# Patient Record
Sex: FEMALE | Race: WHITE | NOT HISPANIC OR LATINO | Employment: OTHER | URBAN - METROPOLITAN AREA
[De-identification: names, ages, dates, MRNs, and addresses within clinical notes are randomized per-mention and may not be internally consistent; named-entity substitution may affect disease eponyms.]

---

## 2022-04-07 ENCOUNTER — HISTORICAL (OUTPATIENT)
Dept: ADMINISTRATIVE | Facility: HOSPITAL | Age: 77
End: 2022-04-07
Payer: MEDICARE

## 2022-04-23 VITALS
HEIGHT: 64 IN | WEIGHT: 145.75 LBS | SYSTOLIC BLOOD PRESSURE: 125 MMHG | OXYGEN SATURATION: 97 % | BODY MASS INDEX: 24.88 KG/M2 | DIASTOLIC BLOOD PRESSURE: 70 MMHG

## 2022-06-05 ENCOUNTER — OFFICE VISIT (OUTPATIENT)
Dept: URGENT CARE | Facility: CLINIC | Age: 77
End: 2022-06-05
Payer: MEDICARE

## 2022-06-05 VITALS
HEART RATE: 78 BPM | WEIGHT: 145 LBS | DIASTOLIC BLOOD PRESSURE: 66 MMHG | RESPIRATION RATE: 17 BRPM | OXYGEN SATURATION: 97 % | HEIGHT: 63 IN | SYSTOLIC BLOOD PRESSURE: 149 MMHG | BODY MASS INDEX: 25.69 KG/M2 | TEMPERATURE: 98 F

## 2022-06-05 DIAGNOSIS — H60.90 OTITIS EXTERNA, UNSPECIFIED CHRONICITY, UNSPECIFIED LATERALITY, UNSPECIFIED TYPE: ICD-10-CM

## 2022-06-05 DIAGNOSIS — R09.82 POSTNASAL DRIP: ICD-10-CM

## 2022-06-05 DIAGNOSIS — H92.02 OTALGIA, LEFT: Primary | ICD-10-CM

## 2022-06-05 PROCEDURE — 99213 PR OFFICE/OUTPT VISIT, EST, LEVL III, 20-29 MIN: ICD-10-PCS | Mod: 25,,, | Performed by: FAMILY MEDICINE

## 2022-06-05 PROCEDURE — 96372 THER/PROPH/DIAG INJ SC/IM: CPT | Mod: ,,, | Performed by: FAMILY MEDICINE

## 2022-06-05 PROCEDURE — 96372 PR INJECTION,THERAP/PROPH/DIAG2ST, IM OR SUBCUT: ICD-10-PCS | Mod: ,,, | Performed by: FAMILY MEDICINE

## 2022-06-05 PROCEDURE — 99213 OFFICE O/P EST LOW 20 MIN: CPT | Mod: 25,,, | Performed by: FAMILY MEDICINE

## 2022-06-05 RX ORDER — DOXYCYCLINE HYCLATE 50 MG/1
50 CAPSULE ORAL DAILY
COMMUNITY
Start: 2022-02-10

## 2022-06-05 RX ORDER — NAPROXEN SODIUM 220 MG/1
1 TABLET, FILM COATED ORAL DAILY
COMMUNITY
Start: 2022-02-26 | End: 2023-09-06

## 2022-06-05 RX ORDER — DEXAMETHASONE SODIUM PHOSPHATE 100 MG/10ML
5 INJECTION INTRAMUSCULAR; INTRAVENOUS
Status: COMPLETED | OUTPATIENT
Start: 2022-06-05 | End: 2022-06-05

## 2022-06-05 RX ORDER — MAGNESIUM 30 MG
30 TABLET ORAL
COMMUNITY

## 2022-06-05 RX ORDER — NEOMYCIN SULFATE, POLYMYXIN B SULFATE, HYDROCORTISONE 3.5; 10000; 1 MG/ML; [USP'U]/ML; MG/ML
3 SOLUTION/ DROPS AURICULAR (OTIC) EVERY 6 HOURS
Qty: 10 ML | Refills: 0 | Status: SHIPPED | OUTPATIENT
Start: 2022-06-05 | End: 2022-06-12

## 2022-06-05 RX ORDER — TRAMADOL HYDROCHLORIDE 50 MG/1
50 TABLET ORAL EVERY 8 HOURS PRN
COMMUNITY
Start: 2022-05-11

## 2022-06-05 RX ORDER — LEVOTHYROXINE SODIUM 88 UG/1
1 TABLET ORAL EVERY MORNING
COMMUNITY
Start: 2022-06-01 | End: 2023-09-06

## 2022-06-05 RX ORDER — CYANOCOBALAMIN 1000 UG/ML
1000 INJECTION, SOLUTION INTRAMUSCULAR; SUBCUTANEOUS
COMMUNITY
Start: 2022-03-14

## 2022-06-05 RX ORDER — AMLODIPINE BESYLATE 2.5 MG/1
1 TABLET ORAL DAILY
COMMUNITY
Start: 2021-08-30

## 2022-06-05 RX ORDER — EZETIMIBE 10 MG/1
10 TABLET ORAL DAILY
COMMUNITY
Start: 2022-03-24

## 2022-06-05 RX ORDER — HYDROXYZINE HYDROCHLORIDE 25 MG/1
25 TABLET, FILM COATED ORAL 3 TIMES DAILY PRN
COMMUNITY
Start: 2021-11-03

## 2022-06-05 RX ORDER — ZOLPIDEM TARTRATE 10 MG/1
10 TABLET ORAL NIGHTLY PRN
COMMUNITY
Start: 2022-05-26

## 2022-06-05 RX ORDER — PREDNISONE 20 MG/1
20 TABLET ORAL DAILY
Qty: 5 TABLET | Refills: 0 | Status: SHIPPED | OUTPATIENT
Start: 2022-06-05 | End: 2022-06-10

## 2022-06-05 RX ORDER — LOSARTAN POTASSIUM 50 MG/1
75 TABLET ORAL DAILY
COMMUNITY
Start: 2022-02-18

## 2022-06-05 RX ADMIN — DEXAMETHASONE SODIUM PHOSPHATE 5 MG: 100 INJECTION INTRAMUSCULAR; INTRAVENOUS at 02:06

## 2022-06-05 NOTE — PATIENT INSTRUCTIONS
Medications sent to pharmacy.  Start the oral steroids tomorrow morning.  Start taking Claritin or Zyrtec daily and Flonase twice a day.  Tylenol or ibuprofen as needed for pain.  As discussed if there is no improvement over the next 24-48 hours notify this clinic and we will refer you to an ear nose throat doctor

## 2022-06-05 NOTE — PROGRESS NOTES
"Subjective:       Patient ID: Lucia Lemus is a 76 y.o. female.    Vitals:  height is 5' 3" (1.6 m) and weight is 65.8 kg (145 lb). Her temperature is 98.4 °F (36.9 °C). Her blood pressure is 149/66 (abnormal) and her pulse is 78. Her respiration is 17 and oxygen saturation is 97%.     Chief Complaint: Otalgia (Left ear pain ( 10) for 3 days )    76 y.o. female presents to clinic with left ear pain for 3 days.  Patient states she is also having sinus congestion and postnasal drip.  Denies any recent swimming but states when pressing over the ear she feels pain.  Denies any fever or drainage from the ear.    Otalgia   There is pain in the left ear. The current episode started in the past 7 days. The pain is at a severity of 10/10.       Constitution: Negative.   HENT: Positive for ear pain, congestion and postnasal drip.    Cardiovascular: Negative.    Eyes: Negative.    Respiratory: Negative.    Gastrointestinal: Negative.    Genitourinary: Negative.    Musculoskeletal: Negative.    Skin: Negative.    Allergic/Immunologic: Negative.    Neurological: Negative.    Hematologic/Lymphatic: Negative.        Objective:      Physical Exam   Constitutional: She is oriented to person, place, and time.   HENT:   Ears:   Left Ear: impacted cerumen (The wax present however there is erythema of the left ear canal.  Left TM dull in appearance. pain with tragal manipulation and pinna traction on the left)  Mouth/Throat: Oropharynx is clear.   Eyes: Conjunctivae are normal.   Abdominal: Normal appearance.   Neurological: She is alert and oriented to person, place, and time.   Psychiatric: Her behavior is normal. Mood, judgment and thought content normal.   Vitals reviewed.         Previous History      Review of patient's allergies indicates:   Allergen Reactions    Soybean     Statins-hmg-coa reductase inhibitors      Other reaction(s): leg cramps    Sulfa (sulfonamide antibiotics) Rash       Past Medical History:   Diagnosis " "Date    Hyperlipidemia     Hypertension     Hypothyroidism      Current Outpatient Medications   Medication Instructions    amLODIPine (NORVASC) 2.5 MG tablet 1 tablet, Oral, Daily    aspirin 81 MG Chew 1 tablet, Oral, Daily    cyanocobalamin 1,000 mcg, Intramuscular, Every 7 days    doxycycline (VIBRAMYCIN) 50 mg, Oral, Daily    ezetimibe (ZETIA) 10 mg, Oral, Daily    hydrOXYzine HCL (ATARAX) 25 mg, Oral, 3 times daily PRN    levothyroxine (SYNTHROID) 88 MCG tablet 1 tablet, Oral, Every morning    losartan (COZAAR) 75 mg, Oral, Daily    magnesium 30 mg, Oral    neomycin-polymyxin-hydrocortisone (CORTISPORIN) otic solution 3 drops, Left Ear, Every 6 hours    predniSONE (DELTASONE) 20 mg, Oral, Daily    traMADoL (ULTRAM) 50 mg, Oral, Every 8 hours PRN    zolpidem (AMBIEN) 10 mg, Oral, Nightly PRN     Past Surgical History:   Procedure Laterality Date    heart stent       HYSTERECTOMY      THYROIDECTOMY       Family History   Problem Relation Age of Onset    No Known Problems Mother     No Known Problems Father        Social History     Tobacco Use    Smoking status: Never Smoker    Smokeless tobacco: Never Used   Substance Use Topics    Alcohol use: Never    Drug use: Never        Physical Exam      Vital Signs Reviewed   BP (!) 149/66   Pulse 78   Temp 98.4 °F (36.9 °C)   Resp 17   Ht 5' 3" (1.6 m)   Wt 65.8 kg (145 lb)   SpO2 97%   BMI 25.69 kg/m²        Procedures    Procedures     Labs   No results found for this or any previous visit.      Assessment:       1. Otalgia, left    2. Postnasal drip    3. Otitis externa, unspecified chronicity, unspecified laterality, unspecified type          Plan:         Medications sent to pharmacy.  Start the oral steroids tomorrow morning.  Start taking Claritin or Zyrtec daily and Flonase twice a day.  Tylenol or ibuprofen as needed for pain.  As discussed if there is no improvement over the next 24-48 hours notify this clinic and we will refer " you to an ear nose throat doctor      Otalgia, left    Postnasal drip    Otitis externa, unspecified chronicity, unspecified laterality, unspecified type    Other orders  -     dexamethasone injection 5 mg  -     predniSONE (DELTASONE) 20 MG tablet; Take 1 tablet (20 mg total) by mouth once daily. for 5 days  Dispense: 5 tablet; Refill: 0  -     neomycin-polymyxin-hydrocortisone (CORTISPORIN) otic solution; Place 3 drops into the left ear every 6 (six) hours. for 7 days  Dispense: 10 mL; Refill: 0

## 2022-08-12 ENCOUNTER — OFFICE VISIT (OUTPATIENT)
Dept: URGENT CARE | Facility: CLINIC | Age: 77
End: 2022-08-12
Payer: MEDICARE

## 2022-08-12 VITALS
DIASTOLIC BLOOD PRESSURE: 68 MMHG | HEART RATE: 68 BPM | SYSTOLIC BLOOD PRESSURE: 177 MMHG | WEIGHT: 145 LBS | BODY MASS INDEX: 25.69 KG/M2 | TEMPERATURE: 98 F | OXYGEN SATURATION: 99 % | HEIGHT: 63 IN | RESPIRATION RATE: 18 BRPM

## 2022-08-12 DIAGNOSIS — T78.40XA ALLERGIC REACTION, INITIAL ENCOUNTER: Primary | ICD-10-CM

## 2022-08-12 PROCEDURE — 96372 PR INJECTION,THERAP/PROPH/DIAG2ST, IM OR SUBCUT: ICD-10-PCS | Mod: ,,, | Performed by: FAMILY MEDICINE

## 2022-08-12 PROCEDURE — 96372 THER/PROPH/DIAG INJ SC/IM: CPT | Mod: ,,, | Performed by: FAMILY MEDICINE

## 2022-08-12 PROCEDURE — 99213 PR OFFICE/OUTPT VISIT, EST, LEVL III, 20-29 MIN: ICD-10-PCS | Mod: 25,,, | Performed by: FAMILY MEDICINE

## 2022-08-12 PROCEDURE — 99213 OFFICE O/P EST LOW 20 MIN: CPT | Mod: 25,,, | Performed by: FAMILY MEDICINE

## 2022-08-12 RX ORDER — DEXAMETHASONE SODIUM PHOSPHATE 100 MG/10ML
8 INJECTION INTRAMUSCULAR; INTRAVENOUS
Status: COMPLETED | OUTPATIENT
Start: 2022-08-12 | End: 2022-08-12

## 2022-08-12 RX ORDER — PREDNISONE 10 MG/1
30 TABLET ORAL DAILY
Qty: 9 TABLET | Refills: 0 | Status: SHIPPED | OUTPATIENT
Start: 2022-08-12 | End: 2022-08-15

## 2022-08-12 RX ADMIN — DEXAMETHASONE SODIUM PHOSPHATE 8 MG: 100 INJECTION INTRAMUSCULAR; INTRAVENOUS at 05:08

## 2022-08-12 NOTE — PATIENT INSTRUCTIONS
Steroids sent to pharmacy.  You can take hydroxyzine as needed for itch.  May cause drowsiness.  Start oral steroids tomorrow.  Stop using the facial cream.  Seek medical attention immediately if your symptoms worsen or you develop lip swelling tongue swelling difficulty swallowing shortness of breath or wheezing

## 2022-08-12 NOTE — PROGRESS NOTES
"Subjective:       Patient ID: Lucia Lemus is a 76 y.o. female.    Vitals:  height is 5' 2.99" (1.6 m) and weight is 65.8 kg (145 lb). Her oral temperature is 98.4 °F (36.9 °C). Her blood pressure is 177/68 (abnormal) and her pulse is 68. Her respiration is 18 and oxygen saturation is 99%.     Chief Complaint: Facial Swelling    76 y.o. female presents to clinic c/o facial swelling, burning, itching x last night. Pt states she used a new facial cream last night.  Denies any lip swelling tongue swelling shortness of breath or wheezing.      Constitution: Negative.   HENT: Negative.    Cardiovascular: Negative.    Eyes: Negative.    Respiratory: Negative.    Gastrointestinal: Negative.    Genitourinary: Negative.    Musculoskeletal: Negative.    Skin: Negative.    Allergic/Immunologic: Negative.    Neurological: Negative.    Hematologic/Lymphatic: Negative.        Objective:      Physical Exam   HENT:   Mouth/Throat: No posterior oropharyngeal erythema (no lip Swelling or tongue swelling). Oropharynx is clear.   Pulmonary/Chest: Effort normal.   Abdominal: Normal appearance.   Neurological: She is alert.   Vitals reviewed.         Previous History      Review of patient's allergies indicates:   Allergen Reactions    Soybean     Statins-hmg-coa reductase inhibitors      Other reaction(s): leg cramps    Sulfa (sulfonamide antibiotics) Rash       Past Medical History:   Diagnosis Date    Hyperlipidemia     Hypertension     Hypothyroidism      Current Outpatient Medications   Medication Instructions    amLODIPine (NORVASC) 2.5 MG tablet 1 tablet, Oral, Daily    aspirin 81 MG Chew 1 tablet, Oral, Daily    cyanocobalamin 1,000 mcg, Intramuscular, Every 7 days    doxycycline 50 mg, Oral, Daily    ezetimibe (ZETIA) 10 mg, Oral, Daily    hydrOXYzine HCL (ATARAX) 25 mg, Oral, 3 times daily PRN    levothyroxine (SYNTHROID) 88 MCG tablet 1 tablet, Oral, Every morning    losartan (COZAAR) 75 mg, Oral, Daily    " "magnesium 30 mg, Oral    predniSONE (DELTASONE) 30 mg, Oral, Daily    traMADoL (ULTRAM) 50 mg, Oral, Every 8 hours PRN    zolpidem (AMBIEN) 10 mg, Oral, Nightly PRN     Past Surgical History:   Procedure Laterality Date    heart stent       HYSTERECTOMY      THYROIDECTOMY       Family History   Problem Relation Age of Onset    No Known Problems Mother     No Known Problems Father        Social History     Tobacco Use    Smoking status: Never Smoker    Smokeless tobacco: Never Used   Substance Use Topics    Alcohol use: Never    Drug use: Never        Physical Exam      Vital Signs Reviewed   BP (!) 177/68 (BP Location: Left arm, Patient Position: Sitting)   Pulse 68   Temp 98.4 °F (36.9 °C) (Oral)   Resp 18   Ht 5' 2.99" (1.6 m)   Wt 65.8 kg (145 lb)   SpO2 99%   BMI 25.69 kg/m²        Procedures    Procedures     Labs   No results found for this or any previous visit.      Assessment:       1. Allergic reaction, initial encounter          Plan:       Steroids sent to pharmacy.  You can take hydroxyzine as needed for itch.  May cause drowsiness.  Start oral steroids tomorrow.  Stop using the facial cream.  Seek medical attention immediately if your symptoms worsen or you develop lip swelling tongue swelling difficulty swallowing shortness of breath or wheezing  Allergic reaction, initial encounter    Other orders  -     dexamethasone injection 8 mg  -     predniSONE (DELTASONE) 10 MG tablet; Take 3 tablets (30 mg total) by mouth once daily. for 3 days  Dispense: 9 tablet; Refill: 0                     "

## 2023-02-23 ENCOUNTER — OFFICE VISIT (OUTPATIENT)
Dept: URGENT CARE | Facility: CLINIC | Age: 78
End: 2023-02-23
Payer: MEDICARE

## 2023-02-23 VITALS
SYSTOLIC BLOOD PRESSURE: 145 MMHG | TEMPERATURE: 99 F | HEART RATE: 74 BPM | DIASTOLIC BLOOD PRESSURE: 72 MMHG | RESPIRATION RATE: 16 BRPM | OXYGEN SATURATION: 98 %

## 2023-02-23 DIAGNOSIS — J34.89 SINUS PRESSURE: ICD-10-CM

## 2023-02-23 DIAGNOSIS — J32.9 SINUSITIS, UNSPECIFIED CHRONICITY, UNSPECIFIED LOCATION: Primary | ICD-10-CM

## 2023-02-23 PROCEDURE — 99213 OFFICE O/P EST LOW 20 MIN: CPT | Mod: 25,,,

## 2023-02-23 PROCEDURE — 96372 THER/PROPH/DIAG INJ SC/IM: CPT | Mod: ,,,

## 2023-02-23 PROCEDURE — 99213 PR OFFICE/OUTPT VISIT, EST, LEVL III, 20-29 MIN: ICD-10-PCS | Mod: 25,,,

## 2023-02-23 PROCEDURE — 96372 PR INJECTION,THERAP/PROPH/DIAG2ST, IM OR SUBCUT: ICD-10-PCS | Mod: ,,,

## 2023-02-23 RX ORDER — AZITHROMYCIN 250 MG/1
TABLET, FILM COATED ORAL
Qty: 6 TABLET | Refills: 0 | Status: SHIPPED | OUTPATIENT
Start: 2023-02-23 | End: 2023-02-28

## 2023-02-23 RX ORDER — CALCITRIOL 0.25 UG/1
0.25 CAPSULE ORAL
COMMUNITY
Start: 2022-10-14

## 2023-02-23 RX ORDER — BETAMETHASONE SODIUM PHOSPHATE AND BETAMETHASONE ACETATE 3; 3 MG/ML; MG/ML
6 INJECTION, SUSPENSION INTRA-ARTICULAR; INTRALESIONAL; INTRAMUSCULAR; SOFT TISSUE
Status: COMPLETED | OUTPATIENT
Start: 2023-02-23 | End: 2023-02-23

## 2023-02-23 RX ADMIN — BETAMETHASONE SODIUM PHOSPHATE AND BETAMETHASONE ACETATE 6 MG: 3; 3 INJECTION, SUSPENSION INTRA-ARTICULAR; INTRALESIONAL; INTRAMUSCULAR; SOFT TISSUE at 12:02

## 2023-02-23 NOTE — PROGRESS NOTES
Subjective:       Patient ID: Lucia Lemus is a 77 y.o. female.    Vitals:  temperature is 98.6 °F (37 °C). Her blood pressure is 147/72 (abnormal) and her pulse is 74. Her respiration is 16 and oxygen saturation is 98%.     Chief Complaint: Headache (Headache, left ear pain x8 days. No OTC medications taken today.)    Headache, left ear pain x8 days. No OTC medications taken today.    Headache     Neurological:  Positive for headaches.     Objective:      Physical Exam      Assessment:       No diagnosis found.      Plan:         There are no diagnoses linked to this encounter.

## 2023-02-23 NOTE — PROGRESS NOTES
Subjective:       Patient ID: Lucia Lemus is a 77 y.o. female.    Vitals:  temperature is 98.6 °F (37 °C). Her blood pressure is 145/72 (abnormal) and her pulse is 74. Her respiration is 16 and oxygen saturation is 98%.     Chief Complaint: Headache (Headache, left ear pain x8 days. No OTC medications taken today.)    Patient is a 77-year-old female that presents complaining of an 8 day history of sinus congestion, sinus pressure headache, left ear pain not getting better with over-the-counter medications.  She states that she called her doctor and they recommended several over-the-counter medications that she is tried with no improvement and she is tired of it.  Patient denies any SOB, CP, rash, n/v/d, or neck stiffness.      Headache   Associated symptoms include ear pain and sinus pressure.     HENT:  Positive for ear pain, congestion and sinus pressure.    Neurological:  Positive for headaches.     Objective:      Physical Exam   Constitutional: She is oriented to person, place, and time.  Non-toxic appearance. She does not appear ill.   HENT:   Ears:   Right Ear: Tympanic membrane, external ear and ear canal normal.   Left Ear: Tympanic membrane, external ear and ear canal normal.   Nose: Rhinorrhea present. Right sinus exhibits maxillary sinus tenderness. Left sinus exhibits maxillary sinus tenderness.   Mouth/Throat: No tonsillar exudate.   Clear PND noted.       Comments: Clear PND noted.   Pulmonary/Chest: Effort normal and breath sounds normal.   Abdominal: Normal appearance and bowel sounds are normal. Soft. There is no abdominal tenderness.   Musculoskeletal: Normal range of motion.         General: Normal range of motion.   Neurological: She is alert and oriented to person, place, and time.   Skin: Skin is warm and dry. Capillary refill takes less than 2 seconds.   Psychiatric: Her behavior is normal. Mood normal.       Assessment:       1. Sinusitis, unspecified chronicity, unspecified location    2.  Sinus pressure        Plan:         Sinusitis, unspecified chronicity, unspecified location  -     betamethasone acetate-betamethasone sodium phosphate injection 6 mg  -     azithromycin (Z-YUMIKO) 250 MG tablet; Take 2 tablets by mouth on day 1; Take 1 tablet by mouth on days 2-5  Dispense: 6 tablet; Refill: 0    Sinus pressure    Wanted to get patient on Augmentin but she requested azithromycin stating it usually works better for her.      Take OTC cough/cold/congestion medication such as Dayquil/Nyquil.  May also take antihistamine such as Claritin/Zyrtec/Allegra.  Cepacol sore throat lozenges if needed.     Drink plenty of fluids.  Rest.      Take antibiotic until completely gone. Take with food to avoid upset stomach.

## 2023-05-21 ENCOUNTER — OFFICE VISIT (OUTPATIENT)
Dept: URGENT CARE | Facility: CLINIC | Age: 78
End: 2023-05-21
Payer: MEDICARE

## 2023-05-21 VITALS
TEMPERATURE: 99 F | DIASTOLIC BLOOD PRESSURE: 77 MMHG | HEART RATE: 64 BPM | BODY MASS INDEX: 25.69 KG/M2 | RESPIRATION RATE: 18 BRPM | HEIGHT: 63 IN | OXYGEN SATURATION: 99 % | SYSTOLIC BLOOD PRESSURE: 152 MMHG | WEIGHT: 145 LBS

## 2023-05-21 DIAGNOSIS — L71.9 ROSACEA: Primary | ICD-10-CM

## 2023-05-21 PROCEDURE — 99213 PR OFFICE/OUTPT VISIT, EST, LEVL III, 20-29 MIN: ICD-10-PCS | Mod: S$PBB,25,, | Performed by: FAMILY MEDICINE

## 2023-05-21 PROCEDURE — 96372 THER/PROPH/DIAG INJ SC/IM: CPT | Mod: S$PBB,,, | Performed by: FAMILY MEDICINE

## 2023-05-21 PROCEDURE — 99213 OFFICE O/P EST LOW 20 MIN: CPT | Mod: S$PBB,25,, | Performed by: FAMILY MEDICINE

## 2023-05-21 PROCEDURE — 96372 PR INJECTION,THERAP/PROPH/DIAG2ST, IM OR SUBCUT: ICD-10-PCS | Mod: S$PBB,,, | Performed by: FAMILY MEDICINE

## 2023-05-21 RX ORDER — CLINDAMYCIN PHOSPHATE 10 UG/ML
LOTION TOPICAL 2 TIMES DAILY
Qty: 60 ML | Refills: 0 | Status: SHIPPED | OUTPATIENT
Start: 2023-05-21 | End: 2023-09-06

## 2023-05-21 RX ORDER — DEXAMETHASONE SODIUM PHOSPHATE 100 MG/10ML
6 INJECTION INTRAMUSCULAR; INTRAVENOUS
Status: COMPLETED | OUTPATIENT
Start: 2023-05-21 | End: 2023-05-21

## 2023-05-21 RX ADMIN — DEXAMETHASONE SODIUM PHOSPHATE 6 MG: 100 INJECTION INTRAMUSCULAR; INTRAVENOUS at 02:05

## 2023-05-21 NOTE — PROGRESS NOTES
"Subjective:      Patient ID: Lucia Lemus is a 77 y.o. female.    Vitals:  height is 5' 2.99" (1.6 m) and weight is 65.8 kg (145 lb). Her temperature is 98.8 °F (37.1 °C). Her blood pressure is 152/77 (abnormal) and her pulse is 64. Her respiration is 18 and oxygen saturation is 99%.     Chief Complaint: Rash    77 y.o. female presents to clinic w/ c/o painful (burning) rash on L side of face x1wk. Reports her dermatologist dx rash as rosacea and prescribed Soolantra w/ no improvement. Other alleviating factors used include aloe vera and cold compresses w/ no improvement.  Patient states she would like a steroid shot since that has worked in the past.    Constitution: Negative.   HENT: Negative.     Cardiovascular: Negative.    Eyes: Negative.    Respiratory: Negative.     Gastrointestinal: Negative.    Genitourinary: Negative.    Musculoskeletal: Negative.    Skin:  Positive for rash.   Allergic/Immunologic: Negative.    Neurological: Negative.    Hematologic/Lymphatic: Negative.     Objective:     Physical Exam   Constitutional: She is oriented to person, place, and time.  Non-toxic appearance. She does not appear ill. No distress.   HENT:   Head: Normocephalic and atraumatic.   Eyes: Conjunctivae are normal.   Abdominal: Normal appearance.   Neurological: She is alert and oriented to person, place, and time.   Skin: Skin is not diaphoretic and rash (flat erythemic rash with warmth on the left side of the face.  No papules or pustules).   Psychiatric: Her behavior is normal. Mood, judgment and thought content normal.        Previous History      Review of patient's allergies indicates:   Allergen Reactions    Soybean     Statins-hmg-coa reductase inhibitors      Other reaction(s): leg cramps    Sulfa (sulfonamide antibiotics) Rash       Past Medical History:   Diagnosis Date    Hyperlipidemia     Hypertension     Hypothyroidism      Current Outpatient Medications   Medication Instructions    amLODIPine (NORVASC) " "2.5 MG tablet 1 tablet, Oral, Daily    aspirin 81 MG Chew 1 tablet, Oral, Daily    calcitRIOL (ROCALTROL) 0.25 mcg, Oral    clindamycin (CLEOCIN T) 1 % lotion Topical (Top), 2 times daily    cyanocobalamin 1,000 mcg, Intramuscular, Every 7 days    doxycycline 50 mg, Oral, Daily    ezetimibe (ZETIA) 10 mg, Oral, Daily    hydrOXYzine HCL (ATARAX) 25 mg, Oral, 3 times daily PRN    levothyroxine (SYNTHROID) 88 MCG tablet 1 tablet, Oral, Every morning    losartan (COZAAR) 75 mg, Oral, Daily    magnesium 30 mg, Oral    traMADoL (ULTRAM) 50 mg, Oral, Every 8 hours PRN    zolpidem (AMBIEN) 10 mg, Oral, Nightly PRN     Past Surgical History:   Procedure Laterality Date    heart stent       HYSTERECTOMY      THYROIDECTOMY       Family History   Problem Relation Age of Onset    No Known Problems Mother     No Known Problems Father     No Known Problems Sister        Social History     Tobacco Use    Smoking status: Never     Passive exposure: Never    Smokeless tobacco: Never   Substance Use Topics    Alcohol use: Never    Drug use: Never        Physical Exam      Vital Signs Reviewed   BP (!) 152/77   Pulse 64   Temp 98.8 °F (37.1 °C)   Resp 18   Ht 5' 2.99" (1.6 m)   Wt 65.8 kg (145 lb)   SpO2 99%   BMI 25.69 kg/m²        Procedures    Procedures     Labs   No results found for this or any previous visit.    Assessment:     1. Rosacea        Plan:   Medications sent to pharmacy  Recommend following up with your dermatologist to discuss further  Continue to avoid your triggers such as excessive sun exposure.  Contact this clinic with any concerns    Rosacea    Other orders  -     dexAMETHasone injection 6 mg  -     clindamycin (CLEOCIN T) 1 % lotion; Apply topically 2 (two) times daily. for 10 days  Dispense: 60 mL; Refill: 0                    "

## 2023-05-21 NOTE — PATIENT INSTRUCTIONS
Medications sent to pharmacy  Recommend following up with your dermatologist to discuss further  Continue to avoid your triggers such as excessive sun exposure.  Contact this clinic with any concerns

## 2023-05-23 ENCOUNTER — PATIENT MESSAGE (OUTPATIENT)
Dept: RESEARCH | Facility: HOSPITAL | Age: 78
End: 2023-05-23
Payer: MEDICARE

## 2023-07-15 ENCOUNTER — OFFICE VISIT (OUTPATIENT)
Dept: URGENT CARE | Facility: CLINIC | Age: 78
End: 2023-07-15
Payer: MEDICARE

## 2023-07-15 VITALS
DIASTOLIC BLOOD PRESSURE: 52 MMHG | OXYGEN SATURATION: 100 % | BODY MASS INDEX: 26.68 KG/M2 | SYSTOLIC BLOOD PRESSURE: 171 MMHG | HEIGHT: 62 IN | TEMPERATURE: 98 F | WEIGHT: 145 LBS | HEART RATE: 69 BPM | RESPIRATION RATE: 17 BRPM

## 2023-07-15 DIAGNOSIS — B96.89 BACTERIAL SINUSITIS: Primary | ICD-10-CM

## 2023-07-15 DIAGNOSIS — J32.9 BACTERIAL SINUSITIS: Primary | ICD-10-CM

## 2023-07-15 PROCEDURE — 99213 PR OFFICE/OUTPT VISIT, EST, LEVL III, 20-29 MIN: ICD-10-PCS | Mod: ,,, | Performed by: FAMILY MEDICINE

## 2023-07-15 PROCEDURE — 99213 OFFICE O/P EST LOW 20 MIN: CPT | Mod: ,,, | Performed by: FAMILY MEDICINE

## 2023-07-15 RX ORDER — AZITHROMYCIN 250 MG/1
TABLET, FILM COATED ORAL
Qty: 6 TABLET | Refills: 0 | Status: SHIPPED | OUTPATIENT
Start: 2023-07-15 | End: 2023-07-20

## 2023-07-15 NOTE — PATIENT INSTRUCTIONS
Antibiotics sent to pharmacy  Start taking an allergy pill daily such as claritin, zyrtec, allegrea or xyzal. Also start using a nasal steroid spray such as flonase or nasacort daily.Monitor for fever. Take tylenol/acetaminophen or ibuprofen as needed. Rest and hydrate.    As discussed, continue to monitor your blood pressure closely at home.  If it remains elevated call your PCP 1st thing Monday morning to discuss further    Steroids were not given due to blood pressure     If symptoms persist or worsen, return to clinic or seek medical attention immediately.

## 2023-07-15 NOTE — PROGRESS NOTES
"Subjective:      Patient ID: Lucia Lemus is a 77 y.o. female.    Vitals:  height is 5' 2" (1.575 m) and weight is 65.8 kg (145 lb). Her temperature is 98.3 °F (36.8 °C). Her blood pressure is 171/52 (abnormal) and her pulse is 69. Her respiration is 17 and oxygen saturation is 100%.     Chief Complaint: Sinus Problem    77 y.o. female presents to clinic w/ c/o h/a, dizziness, congestion, sinus pain and pressure, and L ear fullness w/ decreased hearing x5d. Declined covid, flu and strep testing.  Alleviating factors used include zyrtec, flonase w/ no improvement. Denies fever, neck stiffness, drainage from ear, ringing/popping, pain w/ chewing/applying pressure, cough, wheezing, SOB, N/V/D, abdominal pain and rash.  Patient had similar symptoms earlier in the year and was prescribed azithromycin.  States she did well with it and felt better afterwards.  I did discuss the patient's blood pressure today.  Patient states typically not this high.  She does take her blood pressure medications.  She did taken this morning.  Denies taking any type decongestants stimulant this morning.    Sinus Problem  Associated symptoms include congestion, ear pain and sinus pressure.   Constitution: Negative.   HENT:  Positive for ear pain, congestion, postnasal drip, sinus pain and sinus pressure.    Cardiovascular: Negative.    Eyes: Negative.    Respiratory: Negative.     Gastrointestinal: Negative.    Genitourinary: Negative.    Musculoskeletal: Negative.    Skin: Negative.    Allergic/Immunologic: Negative.    Neurological: Negative.    Hematologic/Lymphatic: Negative.     Objective:     Physical Exam   Constitutional: She is oriented to person, place, and time. She appears well-developed. She is cooperative.  Non-toxic appearance. She does not appear ill. No distress.   HENT:   Head: Normocephalic and atraumatic.   Ears:   Right Ear: Hearing and external ear normal. impacted cerumen (effusion right TM)  Left Ear: Hearing, tympanic " membrane and external ear normal.   Mouth/Throat: Oropharynx is clear and moist and mucous membranes are normal. No posterior oropharyngeal erythema (postnasal drip.  Maxillary sinus tenderness on palpation).   Eyes: Conjunctivae and lids are normal.   Neck: Trachea normal and phonation normal. Neck supple. No edema present. No erythema present. No neck rigidity present.   Cardiovascular: Normal rate.   Pulmonary/Chest: Effort normal and breath sounds normal. No stridor. No respiratory distress. She has no decreased breath sounds. She has no wheezes. She has no rhonchi. She has no rales.   Abdominal: Normal appearance.   Lymphadenopathy:     She has no cervical adenopathy.   Neurological: She is alert and oriented to person, place, and time. She exhibits normal muscle tone. Coordination normal.   Skin: Skin is warm, dry, intact, not diaphoretic and no rash.   Psychiatric: Her speech is normal and behavior is normal. Mood, judgment and thought content normal.   Nursing note and vitals reviewed.       Previous History      Review of patient's allergies indicates:   Allergen Reactions    Soybean     Statins-hmg-coa reductase inhibitors      Other reaction(s): leg cramps    Sulfa (sulfonamide antibiotics) Rash       Past Medical History:   Diagnosis Date    Hyperlipidemia     Hypertension     Hypothyroidism      Current Outpatient Medications   Medication Instructions    amLODIPine (NORVASC) 2.5 MG tablet 1 tablet, Oral, Daily    aspirin 81 MG Chew 1 tablet, Oral, Daily    azithromycin (Z-YUMIKO) 250 MG tablet Take 2 tablets by mouth on day 1; Take 1 tablet by mouth on days 2-5<BR>    calcitRIOL (ROCALTROL) 0.25 mcg, Oral    clindamycin (CLEOCIN T) 1 % lotion Topical (Top), 2 times daily    cyanocobalamin 1,000 mcg, Intramuscular, Every 7 days    doxycycline 50 mg, Oral, Daily    ezetimibe (ZETIA) 10 mg, Oral, Daily    hydrOXYzine HCL (ATARAX) 25 mg, Oral, 3 times daily PRN    levothyroxine (SYNTHROID) 88 MCG tablet 1  "tablet, Oral, Every morning    losartan (COZAAR) 75 mg, Oral, Daily    magnesium 30 mg, Oral    traMADoL (ULTRAM) 50 mg, Oral, Every 8 hours PRN    zolpidem (AMBIEN) 10 mg, Oral, Nightly PRN     Past Surgical History:   Procedure Laterality Date    heart stent       HYSTERECTOMY      THYROIDECTOMY       Family History   Problem Relation Age of Onset    No Known Problems Mother     No Known Problems Father     No Known Problems Sister        Social History     Tobacco Use    Smoking status: Never     Passive exposure: Never    Smokeless tobacco: Never   Substance Use Topics    Alcohol use: Never    Drug use: Never        Physical Exam      Vital Signs Reviewed   BP (!) 171/52   Pulse 69   Temp 98.3 °F (36.8 °C)   Resp 17   Ht 5' 2" (1.575 m)   Wt 65.8 kg (145 lb)   SpO2 100%   BMI 26.52 kg/m²        Procedures    Procedures     Labs   No results found for this or any previous visit.    Assessment:     1. Bacterial sinusitis        Plan:   Antibiotics sent to pharmacy  Start taking an allergy pill daily such as claritin, zyrtec, allegrea or xyzal. Also start using a nasal steroid spray such as flonase or nasacort daily.Monitor for fever. Take tylenol/acetaminophen or ibuprofen as needed. Rest and hydrate.    As discussed, continue to monitor your blood pressure closely at home.  If it remains elevated call your PCP 1st thing Monday morning to discuss further     If symptoms persist or worsen, return to clinic or seek medical attention immediately.       Bacterial sinusitis    Other orders  -     azithromycin (Z-YUMIKO) 250 MG tablet; Take 2 tablets by mouth on day 1; Take 1 tablet by mouth on days 2-5  Dispense: 6 tablet; Refill: 0                    "

## 2023-07-16 ENCOUNTER — TELEPHONE (OUTPATIENT)
Dept: URGENT CARE | Facility: CLINIC | Age: 78
End: 2023-07-16
Payer: MEDICARE

## 2023-07-16 RX ORDER — PREDNISONE 10 MG/1
30 TABLET ORAL DAILY
Qty: 15 TABLET | Refills: 0 | Status: SHIPPED | OUTPATIENT
Start: 2023-07-16 | End: 2023-07-21

## 2023-07-16 NOTE — ADDENDUM NOTE
Addended by: ANDREINA JESSICA on: 7/16/2023 02:17 PM     Modules accepted: Orders    
Hydroureteronephrosis

## 2023-07-16 NOTE — TELEPHONE ENCOUNTER
Pt notified and expressed understanding.   ----- Message from Norberto Willis MD sent at 7/16/2023  2:17 PM CDT -----  Regarding: FW: PT requesting steroids  Steroids sent to pharmacy  ----- Message -----  From: RT Harpreet  Sent: 7/16/2023  11:18 AM CDT  To: [facility]  Subject: PT requesting steroids                           Pt requesting steroids after her BP was 135/62 today. Pt states that providers discussed if her BP was lower today she could have steroids sent to pharmacy at time of visit yesterday. Please send to [facility] pharmacy in [place]. Spoke with Doctor Willis and stated he would send her out steriods to her pharmacy.

## 2023-08-30 ENCOUNTER — OFFICE VISIT (OUTPATIENT)
Dept: URGENT CARE | Facility: CLINIC | Age: 78
End: 2023-08-30
Payer: MEDICARE

## 2023-08-30 VITALS
SYSTOLIC BLOOD PRESSURE: 149 MMHG | HEART RATE: 74 BPM | BODY MASS INDEX: 26.68 KG/M2 | HEIGHT: 62 IN | OXYGEN SATURATION: 98 % | TEMPERATURE: 98 F | DIASTOLIC BLOOD PRESSURE: 75 MMHG | WEIGHT: 145 LBS | RESPIRATION RATE: 17 BRPM

## 2023-08-30 DIAGNOSIS — J01.11 ACUTE RECURRENT FRONTAL SINUSITIS: Primary | ICD-10-CM

## 2023-08-30 PROCEDURE — 99213 OFFICE O/P EST LOW 20 MIN: CPT | Mod: ,,,

## 2023-08-30 PROCEDURE — 99213 PR OFFICE/OUTPT VISIT, EST, LEVL III, 20-29 MIN: ICD-10-PCS | Mod: ,,,

## 2023-08-30 RX ORDER — PREDNISONE 20 MG/1
20 TABLET ORAL DAILY
Qty: 5 TABLET | Refills: 0 | Status: SHIPPED | OUTPATIENT
Start: 2023-08-30 | End: 2023-09-04

## 2023-08-30 RX ORDER — AMOXICILLIN AND CLAVULANATE POTASSIUM 875; 125 MG/1; MG/1
1 TABLET, FILM COATED ORAL EVERY 12 HOURS
Qty: 14 TABLET | Refills: 0 | Status: SHIPPED | OUTPATIENT
Start: 2023-08-30 | End: 2023-09-06

## 2023-08-30 NOTE — PATIENT INSTRUCTIONS
Medications called in to pharmacy   Start the antibiotic today, take all of the medication, even if symptoms improve. Take with food and consider a probiotic or yogurt.   ENT referral sent today, someone will call with an appointment in the next week.   Start taking an allergy pill daily such as claritin, zyrtec, allegrea or xyzal. Also start using a nasal steroid spray such as flonase or nasacort daily. If you are not being treated for high blood pressure, you can also take decongestant such as sudafed as needed. They can be purchased over the counter.Start the oral steroids today. Monitor for fever. Take tylenol/acetaminophen or ibuprofen as needed. Rest and hydrate. If symptoms persist or worsen, return to clinic or seek medical attention immediately.     Continue to monitor your blood pressure closely at home.  If it remains elevated call your PCP 1st thing Monday morning to discuss further

## 2023-08-30 NOTE — PROGRESS NOTES
"Subjective:      Patient ID: Lucia Lemus is a 77 y.o. female.    Vitals:  height is 5' 2" (1.575 m) and weight is 65.8 kg (145 lb). Her temperature is 97.8 °F (36.6 °C). Her blood pressure is 149/75 (abnormal) and her pulse is 74. Her respiration is 17 and oxygen saturation is 98%.     Chief Complaint: Nasal Congestion ( Patient is a 77 y.o. female who presents to urgent care with complaints of congestion, headache, sore throat, sinus pressure  x 10 days . Alleviating factors include OTC meds with no relief. Patient denies fever or n/v/d. )    A 76 y/o female presents to the clinic for c/o sinus pressure, nasal congestion, sinus pain/headache, post nasal drip and sore throat. She denies any fever, body aches, n/v/d, cough, sob, cp, n/v/d, abdominal complaints, rash, difficulty swallowing, neck stiffness, or changes in intake or output.           Constitution: Negative for chills and fever.   HENT:  Positive for congestion, postnasal drip, sinus pain, sinus pressure and sore throat. Negative for trouble swallowing and voice change.    Neck: neck negative.   Eyes: Negative.    Respiratory:  Negative for cough, shortness of breath, wheezing and asthma.    Gastrointestinal: Negative.    Genitourinary: Negative.    Allergic/Immunologic: Negative for asthma.      Objective:     Physical Exam   Constitutional: She is oriented to person, place, and time. She appears well-developed. She is cooperative.  Non-toxic appearance. She does not appear ill. No distress.   HENT:   Head: Normocephalic and atraumatic.   Ears:   Right Ear: Hearing and external ear normal. A middle ear effusion is present.   Left Ear: Hearing and external ear normal. A middle ear effusion is present.   Nose: Congestion (clear pnd) present. Right sinus exhibits maxillary sinus tenderness and frontal sinus tenderness. Left sinus exhibits maxillary sinus tenderness and frontal sinus tenderness.   Mouth/Throat: Uvula is midline and mucous membranes are " Abe Garner is a 82 year old male here for  Chief Complaint   Patient presents with   • Follow-up     Erythrocytosis     Denies latex allergy or sensitivity.    Medication verified and med list updated.  PCP and Pharmacy verified.    Social History     Tobacco Use   Smoking Status Former Smoker   • Packs/day: 0.50   • Years: 15.00   • Pack years: 7.50   • Types: Cigarettes   • Start date: 1955   • Last attempt to quit: 3/11/1968   • Years since quittin.4   Smokeless Tobacco Never Used     Advance Directives Filed: Yes    ECO - Fully active, able to carry on all predisease activities without restrictions.    Height: No.  Ht Readings from Last 1 Encounters:   19 6' (1.829 m)     Weight:Yes, shoes off.  Wt Readings from Last 3 Encounters:   19 90.2 kg   19 89.4 kg   19 88 kg       BMI: Body mass index is 26.98 kg/m².    REVIEW OF SYSTEMS  GENERAL:  Patient denies headache, fevers, chills, night sweats, excessive fatigue, change in appetite, weight loss, dizziness  ALLERGIC/IMMUNOLOGIC: Verified allergies: Yes  EYES:  Patient denies significant visual difficulties, double vision, blurred vision  ENT/MOUTH: Patient denies problems with hearing, sore throat, sinus drainage, mouth sores  ENDOCRINE:  Patient denies diabetes, thyroid disease, hormone replacement, hot flashes  HEMATOLOGIC/LYMPHATIC: Patient denies easy bruising, bleeding, tender lymph nodes, swollen lymph nodes  BREASTS: Patient denies abnormal masses of breast, nipple discharge, pain  RESPIRATORY:  Patient denies lung pain with breathing, cough, coughing up blood, shortness of breath  CARDIOVASCULAR:  Patient denies anginal chest pain, palpitations, shortness of breath when lying flat, peripheral edema  GASTROINTESTINAL: Patient denies abdominal pain , nausea, vomiting, diarrhea, GI bleeding, constipation, change in bowel habits, heartburn, sensation of feeling full, difficulty swallowing  : Patient denies blood in  normal. Mucous membranes are moist. Posterior oropharyngeal erythema present. Oropharynx is clear.   Eyes: Conjunctivae and lids are normal.   Neck: Trachea normal and phonation normal. Neck supple. No edema present. No erythema present. No neck rigidity present.   Cardiovascular: Normal rate.   Pulmonary/Chest: Effort normal and breath sounds normal. No stridor. No respiratory distress. She has no decreased breath sounds. She has no wheezes. She has no rhonchi. She has no rales.   Abdominal: Normal appearance.   Neurological: She is alert and oriented to person, place, and time. She exhibits normal muscle tone. Coordination normal.   Skin: Skin is warm, dry, intact, not diaphoretic and no rash.   Psychiatric: Her speech is normal and behavior is normal. Mood normal.   Nursing note and vitals reviewed.      Assessment:     1. Acute recurrent frontal sinusitis        Plan:       Acute recurrent frontal sinusitis  -     Ambulatory referral/consult to ENT    Other orders  -     amoxicillin-clavulanate 875-125mg (AUGMENTIN) 875-125 mg per tablet; Take 1 tablet by mouth every 12 (twelve) hours. for 7 days  Dispense: 14 tablet; Refill: 0  -     predniSONE (DELTASONE) 20 MG tablet; Take 1 tablet (20 mg total) by mouth once daily. for 5 days  Dispense: 5 tablet; Refill: 0    Medications called in to pharmacy   Start the antibiotic today, take all of the medication, even if symptoms improve. Take with food and consider a probiotic or yogurt.   ENT referral sent today, someone will call with an appointment in the next week.   Start taking an allergy pill daily such as claritin, zyrtec, allegrea or xyzal. Also start using a nasal steroid spray such as flonase or nasacort daily. If you are not being treated for high blood pressure, you can also take decongestant such as sudafed as needed. They can be purchased over the counter.Start the oral steroids today. Monitor for fever. Take tylenol/acetaminophen or ibuprofen as needed.  the urine, burning with urination, but complains of: frequency, urgency, hesitancy and incontinence  MUSCULOSKELETAL:  Patient denies joint pain, bone pain, joint swelling, redness, decreased range of motion  SKIN:  Patient denies chronic rashes, inflammation, ulcerations, skin changes, itching  NEUROLOGIC:  Patient denies loss of balance, areas of focal weakness, abnormal gait, sensory problems, numbness, tingling  PSYCHIATRIC: Patient denies insomnia, depression, anxiety   Rest and hydrate. If symptoms persist or worsen, return to clinic or seek medical attention immediately.     Continue to monitor your blood pressure closely at home.  If it remains elevated call your PCP 1st thing Monday morning to discuss further

## 2023-08-30 NOTE — PROGRESS NOTES
"Subjective:      Patient ID: Lucia Lemus is a 77 y.o. female.    Vitals:  height is 5' 2" (1.575 m) and weight is 65.8 kg (145 lb). Her temperature is 97.8 °F (36.6 °C). Her blood pressure is 149/75 (abnormal) and her pulse is 74. Her respiration is 17 and oxygen saturation is 98%.     Chief Complaint: Nasal Congestion ( Patient is a 77 y.o. female who presents to urgent care with complaints of congestion, headache, sore throat, sinus pressure  x 10 days . Alleviating factors include OTC meds with no relief. Patient denies fever or n/v/d. )     Patient is a 77 y.o. female who presents to urgent care with complaints of congestion, headache, sore throat, sinus pressure  x 10 days . Alleviating factors include OTC meds with no relief. Patient denies fever or n/v/d.   ROS   Objective:     Physical Exam    Assessment:     No diagnosis found.    Plan:       There are no diagnoses linked to this encounter.                "

## 2023-09-06 ENCOUNTER — OFFICE VISIT (OUTPATIENT)
Dept: URGENT CARE | Facility: CLINIC | Age: 78
End: 2023-09-06
Payer: MEDICARE

## 2023-09-06 VITALS
SYSTOLIC BLOOD PRESSURE: 169 MMHG | HEART RATE: 63 BPM | WEIGHT: 145 LBS | HEIGHT: 62 IN | BODY MASS INDEX: 26.68 KG/M2 | RESPIRATION RATE: 14 BRPM | DIASTOLIC BLOOD PRESSURE: 68 MMHG | TEMPERATURE: 99 F | OXYGEN SATURATION: 100 %

## 2023-09-06 DIAGNOSIS — J01.41 ACUTE RECURRENT PANSINUSITIS: Primary | ICD-10-CM

## 2023-09-06 PROCEDURE — 99213 OFFICE O/P EST LOW 20 MIN: CPT | Mod: ,,, | Performed by: FAMILY MEDICINE

## 2023-09-06 PROCEDURE — 99213 PR OFFICE/OUTPT VISIT, EST, LEVL III, 20-29 MIN: ICD-10-PCS | Mod: ,,, | Performed by: FAMILY MEDICINE

## 2023-09-06 RX ORDER — CLONIDINE HYDROCHLORIDE 0.1 MG/1
0.1 TABLET ORAL DAILY PRN
COMMUNITY

## 2023-09-06 RX ORDER — AZITHROMYCIN 250 MG/1
TABLET, FILM COATED ORAL
Qty: 6 TABLET | Refills: 0 | Status: SHIPPED | OUTPATIENT
Start: 2023-09-06

## 2023-09-06 RX ORDER — ALPRAZOLAM 0.5 MG/1
0.5 TABLET ORAL DAILY PRN
COMMUNITY
Start: 2023-08-16

## 2023-09-06 NOTE — PROGRESS NOTES
"Subjective:      Patient ID: Lucia Lemus is a 77 y.o. female.    Vitals:  height is 5' 2" (1.575 m) and weight is 65.8 kg (145 lb). Her temperature is 98.7 °F (37.1 °C). Her blood pressure is 169/68 (abnormal) and her pulse is 63. Her respiration is 14 and oxygen saturation is 100%.     Chief Complaint: Sinus Problem (Sinus pressure, facial pressure and headache ongoing since visit at [facility] on 08/30/23. Dx with Acute recurrent maxillary sinusitis and prescribed Augmentin & Prednisone(both completed). Pt states she has had no improvement since visit. States the antibiotic she received 07/15 (Azithromycin) helped more. )    HPI:  77-year-old female known for multiple medical conditions follows up with primary MD.  Present to clinic with concerns of ongoing nasal congestion, sinus pressure.  Was diagnosed with sinus infection a week ago completed taking Augmentin and prednisone as prescribed.  History of chronic sinus issues.  Today mainly presents with bilateral facial pressure sinus pressure and headache.  No fever.  Blood pressure elevated in the clinic.  Caution with over-the-counter cough and cold medications.  Crusting for Z-Gilmar as it helped in the past.  Understands the risk of increase use of antibiotics    ROS :  Constitutional : _ denies fever body aches or headache  HENT_ positive for nasal congestion, sinus congestion, sinus pressure  Respiratory_no wheezing, no shortness of breath  Cardiovascular_no chest pain  Gastrointestinal_ No vomiting, No diarrhea, No abdominal pain  Musculoskeletal_no joint pain, no joint swelling  Integumentary_no skin rash     Objective:     Physical Exam  General : Alert and Oriented, No apparent distress, afebrile  Neck - supple  HENT : Oropharynx no swelling, bilateral TMs intact mild fluid no redness.    Respiratory : Bilateral equal breath sounds, nonlabored respirations  Cardiovascular : Rate, rhythm regular, normal volume pulse, no murmur  Gastrointestinal: Full abdomen, " soft, nontender to palpate  Integumentary : Warm, Dry and no rash    Assessment:     1. Acute recurrent pansinusitis      Plan:   Cool mist vaporizer to keep there was moist and help draining sinuses.  Trial of nasal rinse.  Continue Zyrtec for congestion along with Flonase.  Monitor the symptoms.  Follow-up with ENT for persistent and worsening symptoms.  Patient requesting for Z-Yumiko as it helped in the past.    Acute recurrent pansinusitis  -     azithromycin (Z-YUMIKO) 250 MG tablet; Take 2 tablets by mouth on day 1; Take 1 tablet by mouth on days 2-5  Dispense: 6 tablet; Refill: 0

## 2023-09-06 NOTE — PATIENT INSTRUCTIONS
Cool mist vaporizer to keep there was moist and help draining sinuses.  Trial of nasal rinse.  Continue Zyrtec for congestion along with Flonase.  Monitor the symptoms.  Follow-up with ENT for persistent and worsening symptoms.  Patient requesting for Z-Gilmar as it helped in the past.

## 2024-01-20 ENCOUNTER — OFFICE VISIT (OUTPATIENT)
Dept: URGENT CARE | Facility: CLINIC | Age: 79
End: 2024-01-20
Payer: MEDICARE

## 2024-01-20 VITALS
TEMPERATURE: 99 F | WEIGHT: 135 LBS | HEIGHT: 62 IN | HEART RATE: 79 BPM | OXYGEN SATURATION: 99 % | BODY MASS INDEX: 24.84 KG/M2 | SYSTOLIC BLOOD PRESSURE: 132 MMHG | RESPIRATION RATE: 18 BRPM | DIASTOLIC BLOOD PRESSURE: 69 MMHG

## 2024-01-20 DIAGNOSIS — J01.90 ACUTE SINUSITIS, RECURRENCE NOT SPECIFIED, UNSPECIFIED LOCATION: Primary | ICD-10-CM

## 2024-01-20 PROCEDURE — 96372 THER/PROPH/DIAG INJ SC/IM: CPT | Mod: ,,,

## 2024-01-20 PROCEDURE — 99213 OFFICE O/P EST LOW 20 MIN: CPT | Mod: 25,,,

## 2024-01-20 RX ORDER — BETAMETHASONE SODIUM PHOSPHATE AND BETAMETHASONE ACETATE 3; 3 MG/ML; MG/ML
6 INJECTION, SUSPENSION INTRA-ARTICULAR; INTRALESIONAL; INTRAMUSCULAR; SOFT TISSUE
Status: COMPLETED | OUTPATIENT
Start: 2024-01-20 | End: 2024-01-20

## 2024-01-20 RX ORDER — DOXYCYCLINE 100 MG/1
100 CAPSULE ORAL EVERY 12 HOURS
Qty: 14 CAPSULE | Refills: 0 | Status: SHIPPED | OUTPATIENT
Start: 2024-01-20 | End: 2024-01-27

## 2024-01-20 RX ADMIN — BETAMETHASONE SODIUM PHOSPHATE AND BETAMETHASONE ACETATE 6 MG: 3; 3 INJECTION, SUSPENSION INTRA-ARTICULAR; INTRALESIONAL; INTRAMUSCULAR; SOFT TISSUE at 09:01

## 2024-01-20 NOTE — PROGRESS NOTES
"Subjective:      Patient ID: Lucia Lemus is a 78 y.o. female.    Vitals:  height is 5' 2" (1.575 m) and weight is 61.2 kg (135 lb). Her oral temperature is 98.6 °F (37 °C). Her blood pressure is 132/69 and her pulse is 79. Her respiration is 18 and oxygen saturation is 99%.     Chief Complaint: Dizziness (Right ear pain x 2 days, sinus pressure x 2 weeks )    Patient is a 78-year-old female who presents to urgent care clinic with complaints of sinus pressure that has been present for 2 weeks, patient is also complaining of bilateral otalgia, pressure and fluid in the ears.  She reports stated symptoms of vertigo, dizziness due to fluid in her ear yesterday.  Denies cough, fever, body aches, chills, neck stiffness, rash, GI symptoms, shortness of breath.  Patient has a history of recurrent sinusitis has not been treated with antibiotics since September.  He reports today sinus pressure is primarily maxillary.        Neurological:  Positive for dizziness.      Objective:     Physical Exam   Constitutional: She is oriented to person, place, and time. She appears well-developed. She is cooperative.  Non-toxic appearance. She does not appear ill. No distress.   HENT:   Head: Normocephalic and atraumatic.   Ears:   Right Ear: Hearing, external ear and ear canal normal. A middle ear effusion is present.   Left Ear: Hearing, external ear and ear canal normal. A middle ear effusion is present.      Comments: Bilateral TM:  Clear fluid, effusion  Nose: No mucosal edema, rhinorrhea or nasal deformity. No epistaxis. Right sinus exhibits maxillary sinus tenderness. Right sinus exhibits no frontal sinus tenderness. Left sinus exhibits maxillary sinus tenderness. Left sinus exhibits no frontal sinus tenderness.   Mouth/Throat: Uvula is midline, oropharynx is clear and moist and mucous membranes are normal. Mucous membranes are moist. No trismus in the jaw. Normal dentition. No uvula swelling. No oropharyngeal exudate, posterior " oropharyngeal edema or posterior oropharyngeal erythema.   Eyes: Conjunctivae and lids are normal. No scleral icterus.   Neck: Trachea normal and phonation normal. Neck supple. No edema present. No erythema present. No neck rigidity present.   Cardiovascular: Normal rate, regular rhythm, normal heart sounds and normal pulses.   Pulmonary/Chest: Effort normal and breath sounds normal. No respiratory distress. She has no decreased breath sounds. She has no rhonchi.   Abdominal: Normal appearance.   Musculoskeletal: Normal range of motion.         General: No deformity. Normal range of motion.   Lymphadenopathy:     She has no cervical adenopathy.   Neurological: She is alert and oriented to person, place, and time. She exhibits normal muscle tone. Coordination normal.   Skin: Skin is warm, dry, intact, not diaphoretic and not pale.   Psychiatric: Her speech is normal and behavior is normal. Judgment and thought content normal.   Nursing note and vitals reviewed.      Assessment:     1. Acute sinusitis, recurrence not specified, unspecified location        Plan:       Acute sinusitis, recurrence not specified, unspecified location  -     betamethasone acetate-betamethasone sodium phosphate injection 6 mg  -     doxycycline (MONODOX) 100 MG capsule; Take 1 capsule (100 mg total) by mouth every 12 (twelve) hours. for 7 days  Dispense: 14 capsule; Refill: 0           Patient is requesting steroid injection in clinic over oral steroids.  Blood Pressure repeated, given low-dose Celestone in clinic.  Patient has previously stated azithromycin works for sinuses however she has had azithromycin for sinusitis multiple times over the last year.  Will avoid at this time will treat with doxycycline.      Antibiotics with food.  Take antibiotics until complete.  Drink plenty of fluids. Get plenty of rest.   Claritin or Zyrtec for nasal congestion.  Nasal spray such as Nasacort or Flonase for congestion.  Warm saltwater gargles for  sore throat.   Tylenol as needed for sore throat and fever.   Chloraseptic Spray for worsening sore throat  Call or return to clinic as needed   Go to the ER with any significant change or worsening of symptoms.   Follow up with your primary care doctor.

## 2024-01-20 NOTE — PATIENT INSTRUCTIONS
Antibiotics with food.  Take antibiotics until complete.  Drink plenty of fluids. Get plenty of rest.   Claritin or Zyrtec for nasal congestion.  Nasal spray such as Nasacort or Flonase for congestion.  Warm saltwater gargles for sore throat.   Tylenol as needed for sore throat and fever.   Chloraseptic Spray for worsening sore throat  Call or return to clinic as needed   Go to the ER with any significant change or worsening of symptoms.   Follow up with your primary care doctor.

## 2024-04-21 ENCOUNTER — OFFICE VISIT (OUTPATIENT)
Dept: URGENT CARE | Facility: CLINIC | Age: 79
End: 2024-04-21
Payer: MEDICARE

## 2024-04-21 VITALS
HEIGHT: 62 IN | DIASTOLIC BLOOD PRESSURE: 65 MMHG | BODY MASS INDEX: 24.84 KG/M2 | SYSTOLIC BLOOD PRESSURE: 156 MMHG | HEART RATE: 62 BPM | OXYGEN SATURATION: 100 % | WEIGHT: 135 LBS | TEMPERATURE: 98 F | RESPIRATION RATE: 18 BRPM

## 2024-04-21 DIAGNOSIS — J32.9 SINUSITIS, UNSPECIFIED CHRONICITY, UNSPECIFIED LOCATION: Primary | ICD-10-CM

## 2024-04-21 PROCEDURE — 96372 THER/PROPH/DIAG INJ SC/IM: CPT | Mod: ,,,

## 2024-04-21 PROCEDURE — 99213 OFFICE O/P EST LOW 20 MIN: CPT | Mod: 25,,,

## 2024-04-21 RX ORDER — PRAVASTATIN SODIUM 20 MG/1
TABLET ORAL
COMMUNITY

## 2024-04-21 RX ORDER — PERMETHRIN 50 MG/G
CREAM TOPICAL
COMMUNITY
Start: 2023-12-11

## 2024-04-21 RX ORDER — BETAMETHASONE SODIUM PHOSPHATE AND BETAMETHASONE ACETATE 3; 3 MG/ML; MG/ML
6 INJECTION, SUSPENSION INTRA-ARTICULAR; INTRALESIONAL; INTRAMUSCULAR; SOFT TISSUE
Status: COMPLETED | OUTPATIENT
Start: 2024-04-21 | End: 2024-04-21

## 2024-04-21 RX ORDER — AZITHROMYCIN 250 MG/1
TABLET, FILM COATED ORAL
Qty: 6 TABLET | Refills: 0 | Status: SHIPPED | OUTPATIENT
Start: 2024-04-21 | End: 2024-04-26

## 2024-04-21 RX ORDER — ONDANSETRON 4 MG/1
4 TABLET, ORALLY DISINTEGRATING ORAL EVERY 8 HOURS PRN
COMMUNITY
Start: 2024-03-27

## 2024-04-21 RX ORDER — NITROGLYCERIN 0.4 MG/1
0.4 TABLET SUBLINGUAL
COMMUNITY
Start: 2024-02-28

## 2024-04-21 RX ADMIN — BETAMETHASONE SODIUM PHOSPHATE AND BETAMETHASONE ACETATE 6 MG: 3; 3 INJECTION, SUSPENSION INTRA-ARTICULAR; INTRALESIONAL; INTRAMUSCULAR; SOFT TISSUE at 02:04

## 2024-04-21 NOTE — PATIENT INSTRUCTIONS
Take antibiotic until completely gone. Take with food to avoid upset stomach.     Take OTC Decongestants  (Claritin D/sudafed OR Coricidin for people with HTN) to cut down on the fluid in the lining of your nose and relieve swollen nasal passages and congestion. May also use cough/cold/congestion medication such as Dayquil/Nyquil and/or antihistamine such as Claritin/Zyrtec/Allegra.     Drink plenty of fluids.  Rest.      Follow up with primary care in 5-6 days if not better.     Go directly to emergency room if you begin to have shortness of breath, chest pain, or other worrisome symptoms.

## 2024-04-21 NOTE — PROGRESS NOTES
"Subjective:      Patient ID: Lucia Lemus is a 78 y.o. female.    Vitals:  height is 5' 2" (1.575 m) and weight is 61.2 kg (135 lb). Her oral temperature is 98.4 °F (36.9 °C). Her blood pressure is 156/65 (abnormal) and her pulse is 62. Her respiration is 18 and oxygen saturation is 100%.     Chief Complaint: Headache    Patient is a 78 y.o. female who presents to urgent care with complaints of sinus headache on left side of head, left-sided sinus pressure, left ear pressure with a dry cough that gets worse at night x 10 days. Patient denies any SOB, CP, rash, n/v/d, or neck stiffness.  She states usually when this happens she needs a steroid shot and a Z-Gilmar.  States that last time she was here she was given doxycycline and that does not work for her.     Headache   Associated symptoms include coughing, ear pain and sinus pressure.     HENT:  Positive for ear pain, congestion and sinus pressure.    Respiratory:  Positive for cough.    Neurological:  Positive for headaches.      Objective:     Physical Exam   Constitutional: She is oriented to person, place, and time.  Non-toxic appearance. She does not appear ill.   HENT:   Ears:   Right Ear: Tympanic membrane, external ear and ear canal normal.   Left Ear: Tympanic membrane, external ear and ear canal normal.   Nose: Congestion present. Left sinus exhibits maxillary sinus tenderness and frontal sinus tenderness.   Mouth/Throat: Mucous membranes are moist. No posterior oropharyngeal erythema. No tonsillar exudate. Oropharynx is clear.      Comments: Clear postnasal drip  Eyes: Conjunctivae are normal.   Neck: Neck supple. No neck rigidity present.   Cardiovascular: Normal rate and normal pulses.   Pulmonary/Chest: Effort normal and breath sounds normal.   Abdominal: Normal appearance and bowel sounds are normal. Soft. There is no abdominal tenderness.   Neurological: She is alert and oriented to person, place, and time.   Skin: Skin is warm and no rash. Capillary " refill takes less than 2 seconds.   Psychiatric: Her behavior is normal. Mood normal.   Nursing note and vitals reviewed.      Assessment:     1. Sinusitis, unspecified chronicity, unspecified location        Plan:       Sinusitis, unspecified chronicity, unspecified location  -     betamethasone acetate-betamethasone sodium phosphate injection 6 mg  -     azithromycin (Z-YUMIKO) 250 MG tablet; Take 2 tablets by mouth on day 1; Take 1 tablet by mouth on days 2-5  Dispense: 6 tablet; Refill: 0      Take antibiotic until completely gone. Take with food to avoid upset stomach.     Take OTC Decongestants  (Claritin D/sudafed OR Coricidin for people with HTN) to cut down on the fluid in the lining of your nose and relieve swollen nasal passages and congestion. May also use cough/cold/congestion medication such as Dayquil/Nyquil and/or antihistamine such as Claritin/Zyrtec/Allegra.     Drink plenty of fluids.  Rest.      Follow up with primary care in 5-6 days if not better.     Go directly to emergency room if you begin to have shortness of breath, chest pain, or other worrisome symptoms.

## 2024-08-25 ENCOUNTER — OFFICE VISIT (OUTPATIENT)
Dept: URGENT CARE | Facility: CLINIC | Age: 79
End: 2024-08-25
Payer: MEDICARE

## 2024-08-25 VITALS
OXYGEN SATURATION: 98 % | SYSTOLIC BLOOD PRESSURE: 181 MMHG | HEIGHT: 62 IN | DIASTOLIC BLOOD PRESSURE: 78 MMHG | WEIGHT: 135 LBS | BODY MASS INDEX: 24.84 KG/M2 | RESPIRATION RATE: 17 BRPM | TEMPERATURE: 98 F | HEART RATE: 71 BPM

## 2024-08-25 DIAGNOSIS — R05.1 ACUTE COUGH: Primary | ICD-10-CM

## 2024-08-25 DIAGNOSIS — R09.81 NASAL CONGESTION: ICD-10-CM

## 2024-08-25 DIAGNOSIS — J01.01 ACUTE RECURRENT MAXILLARY SINUSITIS: ICD-10-CM

## 2024-08-25 PROCEDURE — 96372 THER/PROPH/DIAG INJ SC/IM: CPT | Mod: ,,, | Performed by: PHYSICIAN ASSISTANT

## 2024-08-25 PROCEDURE — 99213 OFFICE O/P EST LOW 20 MIN: CPT | Mod: 25,,, | Performed by: PHYSICIAN ASSISTANT

## 2024-08-25 RX ORDER — AMOXICILLIN 875 MG/1
875 TABLET, FILM COATED ORAL 2 TIMES DAILY
Qty: 20 TABLET | Refills: 0 | Status: SHIPPED | OUTPATIENT
Start: 2024-08-25 | End: 2024-09-04

## 2024-08-25 RX ORDER — DEXAMETHASONE SODIUM PHOSPHATE 10 MG/ML
8 INJECTION INTRAMUSCULAR; INTRAVENOUS
Status: COMPLETED | OUTPATIENT
Start: 2024-08-25 | End: 2024-08-25

## 2024-08-25 RX ORDER — PROMETHAZINE HYDROCHLORIDE AND DEXTROMETHORPHAN HYDROBROMIDE 6.25; 15 MG/5ML; MG/5ML
5 SYRUP ORAL EVERY 8 HOURS PRN
Qty: 118 ML | Refills: 0 | Status: SHIPPED | OUTPATIENT
Start: 2024-08-25 | End: 2024-08-30

## 2024-08-25 RX ADMIN — DEXAMETHASONE SODIUM PHOSPHATE 8 MG: 10 INJECTION INTRAMUSCULAR; INTRAVENOUS at 01:08

## 2024-08-25 NOTE — PATIENT INSTRUCTIONS
Recommend amoxicillin antibiotic coverage if concern for sinus infection.  Recommend daily non sedating antihistamine Claritin Zyrtec or loratadine over the next week with Coricidin decongestant if needed for nasal congestion sinus pressure and inflammation.  Phenergan DM sparingly lowest dose if needed for severe cough cold congestion body aches and rest.  Encouraged plenty of water and noncarbonated fluids hydration and rest over the next week.  Tylenol if needed for fever, aches and pains.  Recommend follow-up with primary care physician in 1 week for re-evaluation if not improving.

## 2024-08-25 NOTE — PROGRESS NOTES
"Subjective:      Patient ID: Lucia Lemus is a 78 y.o. female.    Vitals:  height is 5' 2" (1.575 m) and weight is 61.2 kg (135 lb). Her temperature is 98.1 °F (36.7 °C). Her blood pressure is 181/78 (abnormal) and her pulse is 71. Her respiration is 17 and oxygen saturation is 98%.     Chief Complaint: Sinus Problem    Elderly female reports 8 days of sinus pressure with headache congestion body aches not improved with over-the-counter Flonase nasal states now sinus congestion maxillary sinus pain worse in the evening presents to urgent Care for evaluation.  Patient refuses viral testing    Sinus Problem  This is a recurrent problem. Associated symptoms include congestion, coughing, headaches and sinus pressure. Pertinent negatives include no ear pain, neck pain, shortness of breath or sore throat.     Constitution: Positive for fatigue. Negative for fever.   HENT:  Positive for congestion, postnasal drip, sinus pain and sinus pressure. Negative for ear pain, sore throat, trouble swallowing and voice change.    Neck: Negative for neck pain and neck swelling.   Cardiovascular: Negative.    Respiratory:  Positive for cough. Negative for shortness of breath, stridor and wheezing.    Gastrointestinal: Negative.    Musculoskeletal:  Positive for muscle ache.   Skin: Negative.    Allergic/Immunologic: Negative.    Neurological:  Positive for headaches.      Objective:     Physical Exam   Constitutional: She is oriented to person, place, and time. She appears well-developed. She is cooperative.  Non-toxic appearance.      Comments:Awake alert ambulatory nasally congested female     HENT:   Head: Normocephalic.   Ears:   Right Ear: Hearing, tympanic membrane, external ear and ear canal normal.   Left Ear: Hearing, tympanic membrane, external ear and ear canal normal.   Nose: Mucosal edema and congestion present. No rhinorrhea, purulent discharge or nasal deformity. No epistaxis. Right sinus exhibits no maxillary sinus " tenderness and no frontal sinus tenderness. Left sinus exhibits maxillary sinus tenderness. Left sinus exhibits no frontal sinus tenderness.      Comments: Moderate  Mouth/Throat: Uvula is midline, oropharynx is clear and moist and mucous membranes are normal. No trismus in the jaw. Normal dentition. No uvula swelling. No oropharyngeal exudate, posterior oropharyngeal edema or posterior oropharyngeal erythema.   Eyes: Conjunctivae and lids are normal.   Neck: Trachea normal and phonation normal. Neck supple. No edema present. No erythema present. No neck rigidity present.   Cardiovascular: Normal rate, regular rhythm, normal heart sounds and normal pulses.   Pulmonary/Chest: Effort normal and breath sounds normal. No respiratory distress. She has no decreased breath sounds. She has no rhonchi.   Musculoskeletal: Normal range of motion.         General: Normal range of motion.      Cervical back: She exhibits no tenderness.   Lymphadenopathy:     She has no cervical adenopathy.   Neurological: no focal deficit. She is alert and oriented to person, place, and time. She displays no weakness. She exhibits normal muscle tone.   Skin: Skin is warm, dry, intact, not diaphoretic and not pale.   Psychiatric: Her speech is normal.   Nursing note and vitals reviewed.         Previous History      Review of patient's allergies indicates:   Allergen Reactions    Soybean     Statins-hmg-coa reductase inhibitors Other (See Comments)     Other reaction(s): leg cramps  mucsle cramping    Sulfa (sulfonamide antibiotics) Rash       Past Medical History:   Diagnosis Date    Bulge of lumbar disc without myelopathy     Coronary artery disease     History of stroke     Hyperlipidemia     Hypertension     Hypoglycemia     Hypothyroidism     Insomnia     Lupus     Osteoporosis     Stage 3 chronic kidney disease      Current Outpatient Medications   Medication Instructions    ALPRAZolam (XANAX) 0.5 mg, Oral, Daily PRN    amLODIPine (NORVASC)  "2.5 MG tablet 1 tablet, Oral, Daily    amoxicillin (AMOXIL) 875 mg, Oral, 2 times daily    aspirin 81 MG Chew 1 tablet, Oral, Daily    azithromycin (Z-YUMIKO) 250 MG tablet Take 2 tablets by mouth on day 1; Take 1 tablet by mouth on days 2-5<BR>    calcitRIOL (ROCALTROL) 0.25 mcg, Oral    cloNIDine (CATAPRES) 0.1 mg, Oral, Daily PRN    cyanocobalamin 1,000 mcg, Intramuscular, Every 7 days    doxycycline 50 mg, Oral, Daily    ezetimibe (ZETIA) 10 mg, Oral, Daily    hydrOXYzine HCL (ATARAX) 25 mg, Oral, 3 times daily PRN    levothyroxine (SYNTHROID) 88 MCG tablet 1 tablet, Oral, Every morning    losartan (COZAAR) 75 mg, Oral, Daily    magnesium 30 mg, Oral    nitroGLYCERIN (NITROSTAT) 0.4 mg, Sublingual, As needed (PRN)    ondansetron (ZOFRAN-ODT) 4 mg, Oral, Every 8 hours PRN    permethrin (ELIMITE) 5 % cream     pravastatin (PRAVACHOL) 20 MG tablet Oral    promethazine-dextromethorphan (PROMETHAZINE-DM) 6.25-15 mg/5 mL Syrp 5 mLs, Oral, Every 8 hours PRN    traMADoL (ULTRAM) 50 mg, Oral, Every 8 hours PRN    zolpidem (AMBIEN) 10 mg, Oral, Nightly PRN     Past Surgical History:   Procedure Laterality Date    ESOPHAGOGASTRODUODENOSCOPY      heart stent       HYSTERECTOMY      THYROIDECTOMY      TOTAL KNEE ARTHROPLASTY Right      Family History   Problem Relation Name Age of Onset    No Known Problems Mother      No Known Problems Father      Dementia Sister      No Known Problems Brother         Social History     Tobacco Use    Smoking status: Never     Passive exposure: Never    Smokeless tobacco: Never   Substance Use Topics    Alcohol use: Never    Drug use: Never        Physical Exam      Vital Signs Reviewed   BP (!) 181/78   Pulse 71   Temp 98.1 °F (36.7 °C)   Resp 17   Ht 5' 2" (1.575 m)   Wt 61.2 kg (135 lb)   SpO2 98%   BMI 24.69 kg/m²        Procedures    Procedures     Labs   No results found for this or any previous visit.    Assessment:     1. Acute cough    2. Nasal congestion    3. Acute recurrent " maxillary sinusitis        Plan:     Recommend amoxicillin antibiotic coverage if concern for sinus infection.  Recommend daily non sedating antihistamine Claritin Zyrtec or loratadine over the next week with Coricidin decongestant if needed for nasal congestion sinus pressure and inflammation.  Phenergan DM sparingly lowest dose if needed for severe cough cold congestion body aches and rest.  Encouraged plenty of water and noncarbonated fluids hydration and rest over the next week.  Tylenol if needed for fever, aches and pains.  Recommend follow-up with primary care physician in 1 week for re-evaluation if not improving.  Acute cough    Nasal congestion    Acute recurrent maxillary sinusitis    Other orders  -     dexAMETHasone injection 8 mg  -     amoxicillin (AMOXIL) 875 MG tablet; Take 1 tablet (875 mg total) by mouth 2 (two) times daily. for 10 days  Dispense: 20 tablet; Refill: 0  -     promethazine-dextromethorphan (PROMETHAZINE-DM) 6.25-15 mg/5 mL Syrp; Take 5 mLs by mouth every 8 (eight) hours as needed (cough).  Dispense: 118 mL; Refill: 0